# Patient Record
Sex: FEMALE | Race: WHITE | Employment: OTHER | URBAN - METROPOLITAN AREA
[De-identification: names, ages, dates, MRNs, and addresses within clinical notes are randomized per-mention and may not be internally consistent; named-entity substitution may affect disease eponyms.]

---

## 2018-05-09 ENCOUNTER — ALLIED HEALTH/NURSE VISIT (OUTPATIENT)
Dept: FAMILY MEDICINE | Facility: CLINIC | Age: 33
End: 2018-05-09
Payer: COMMERCIAL

## 2018-05-09 VITALS — WEIGHT: 194 LBS

## 2018-05-09 DIAGNOSIS — I10 ESSENTIAL HYPERTENSION: ICD-10-CM

## 2018-05-09 DIAGNOSIS — N91.2 AMENORRHEA: Primary | ICD-10-CM

## 2018-05-09 LAB — HCG UR QL: POSITIVE

## 2018-05-09 PROCEDURE — 81025 URINE PREGNANCY TEST: CPT | Performed by: FAMILY MEDICINE

## 2018-05-09 PROCEDURE — 99207 ZZC NO CHARGE NURSE ONLY: CPT

## 2018-05-09 RX ORDER — PRENATAL VIT/IRON FUM/FOLIC AC 27MG-0.8MG
1 TABLET ORAL DAILY
Qty: 100 TABLET | Refills: 3 | COMMUNITY
Start: 2018-05-09 | End: 2021-12-17

## 2018-05-09 RX ORDER — METHYLDOPA 250 MG/1
250 TABLET, FILM COATED ORAL 3 TIMES DAILY
COMMUNITY
Start: 2018-05-09 | End: 2018-05-09 | Stop reason: ALTCHOICE

## 2018-05-09 RX ORDER — METHYLDOPA 250 MG/1
250 TABLET, FILM COATED ORAL 2 TIMES DAILY
Qty: 180 TABLET | Refills: 0 | Status: SHIPPED | OUTPATIENT
Start: 2018-05-09 | End: 2018-08-29

## 2018-05-09 NOTE — MR AVS SNAPSHOT
"              After Visit Summary   5/9/2018    Eleni Vallecillo    MRN: [id number]           Patient Information     Date Of Birth          1985        Visit Information        Provider Department      5/9/2018 10:30 AM PH NURSE [facility]        Today's Diagnoses     Amenorrhea    -  1    Essential hypertension           Follow-ups after your visit        Your next 10 appointments already scheduled     May 29, 2018  1:30 PM CDT   ESTABLISHED PRENATAL with Kandice Raza MD   [facility] ([facility])    [address] [place] [id number] [phone]              Who to contact     If you have questions or need follow up information about today's clinic visit or your schedule please contact [facility] directly at [phone].  Normal or non-critical lab and imaging results will be communicated to you by MyChart, letter or phone within 4 business days after the clinic has received the results. If you do not hear from us within 7 days, please contact the clinic through MyChart or phone. If you have a critical or abnormal lab result, we will notify you by phone as soon as possible.  Submit refill requests through [email or web address] or call your pharmacy and they will forward the refill request to us. Please allow 3 business days for your refill to be completed.          Additional Information About Your Visit        MyChart Information     [email or web address] lets you send messages to your doctor, view your test results, renew your prescriptions, schedule appointments and more. To sign up, go to [email or web address] . Click on \"Log in\" on the left side of the screen, which will take you to the Welcome page. Then click on \"Sign up Now\" on the right side of the page.     You will be asked to enter the access code listed below, as well as some personal information. Please follow the directions to create your username and password.     Your access " code is: [email or web address]  Expires: 2018 11:42 AM     Your access code will  in 90 days. If you need help or a new code, please call your [place] clinic or [phone].        Care EveryWhere ID     This is your Care EveryWhere ID. This could be used by other organizations to access your [place] medical records  [id number]         Blood Pressure from Last 3 Encounters:   No data found for BP    Weight from Last 3 Encounters:   18 194 lb (88 kg)              We Performed the Following     HCG qualitative urine          Today's Medication Changes          These changes are accurate as of 18 11:42 AM.  If you have any questions, ask your nurse or doctor.               These medicines have changed or have updated prescriptions.        Dose/Directions    methyldopa 250 MG tablet   Commonly known as:  ALDOMET   This may have changed:    - medication strength  - when to take this   Used for:  Essential hypertension        Dose:  250 mg   Take 1 tablet (250 mg) by mouth 2 times daily   Quantity:  180 tablet   Refills:  0            Where to get your medicines      These medications were sent to [facility] [address] [place] [address] AT [facility] [address] [facility] [address]  [address] [facility] [address]     Phone:  [phone]     methyldopa 250 MG tablet                Primary Care Provider Fax #    Physician No Ref-Primary [phone]       No address on file        Equal Access to Services     PATT HUTCHINS AH: Gopal bran Soabram, waaxda luqadaha, qaybta kaalmada valerie, nicolas stanley. So [facility] [phone].    ATENCIÓN: Si habla español, tiene a felton disposición servicios gratuitos de asistencia lingüística. Aide de la cruz [phone].    We comply with applicable federal civil rights laws and [place] laws. We do not discriminate on the basis of race, color, national origin, age, disability, sex, sexual orientation, or gender  identity.            Thank you!     Thank you for choosing [facility]  for your care. Our goal is always to provide you with excellent care. Hearing back from our patients is one way we can continue to improve our services. Please take a few minutes to complete the written survey that you may receive in the mail after your visit with us. Thank you!             Your Updated Medication List - Protect others around you: Learn how to safely use, store and throw away your medicines at www.disposemymeds.org.          This list is accurate as of 5/9/18 11:42 AM.  Always use your most recent med list.                   Brand Name Dispense Instructions for use Diagnosis    methyldopa 250 MG tablet    ALDOMET    180 tablet    Take 1 tablet (250 mg) by mouth 2 times daily    Essential hypertension       prenatal multivitamin plus iron 27-0.8 MG Tabs per tablet     100 tablet    Take 1 tablet by mouth daily

## 2018-05-09 NOTE — PROGRESS NOTES
Eleni Vallecillo is a 32 year old here today for a pregnancy test.  LMP: No LMP recorded.  Wt: 194 lbs 0 oz.    Symptoms include weight gain, breast tenderness, absence of menses, fatigue and morning sickness.    Eleni informed of positive pregnancy test results. JORGE: 18    Educational advice given: nutrition.    Current medications reviewed: Yes    Previous pregnancy history remarkable for: hypertension.    Plan: schedule appointment with OB Educator and/or OB class, follow-up appointment with Dr. Rzaa for pre-bari care, take multivitamin or pre- vitamins and OB Education packet given.    She is to call back if she has any questions or concerns.  She is advised to notify a provider immediately if she experiences any severe cramping or abdominal pain or any vaginal bleeding.    NOTES: Patient is a Jehova's witness so no blood products. Reports that she worked with a midwife for her last pregnancy and she labored in the water but ended up delivering on the table.  She was put on methyldopa for high BP which she has continue to take since that birth a few years ago.  BP today was 132/80 so Dr. Raza would like her to increase to 250 mg BID. Pt reports that she had a mild hemorrhage and some tearing with last delivery.  She also notes that she had a miscarriage in 2017 at 8 weeks.  Denies any bleeding or cramping with this pregnancy.      Priscila Tony RN. . .  2018, 11:16 AM

## 2018-05-14 ENCOUNTER — PRENATAL OFFICE VISIT (OUTPATIENT)
Dept: FAMILY MEDICINE | Facility: CLINIC | Age: 33
End: 2018-05-14
Payer: COMMERCIAL

## 2018-05-14 VITALS — HEIGHT: 72 IN

## 2018-05-14 DIAGNOSIS — Z32.01 PREGNANCY, CONFIRMED, NOT FIRST: Primary | ICD-10-CM

## 2018-05-14 PROCEDURE — 99207 ZZC NO CHARGE NURSE ONLY: CPT

## 2018-05-14 NOTE — PROGRESS NOTES
1. Have you had chicken pox?   No    Genetic Screening    Has the patient, baby's father, or anyone in either family had:     1. Thalassemia and and MCV result less than 80?No   2.  Neural tube defect? No   3.  Congenital heart defect?No   4. Down's syndrome?No   5.  Joseph-Sachs disease?No   6. Sickle Cell disease or trait?No   7. Hemophilia or other inherited problems of blood?No   8. Muscular dystropy?No   9.  Cystic fibrosis?No  10. Chucho's Chorea?No  11. Mental Retardation or autism?  No         If yes, was the person tested for Fragile X?   12. Any other inherited genetic or chromosomal disorders?No  13. Metabolic disorders such as diabetes or PKU?No  14. A child born with defects not listed above?No  15. Recurrent pregnancy loss or stillbirth?No  16.  Has the patient had any medications/street drugs/alcohol since her last menstrual period?No  18.  Does the patient or baby's father have any other genetic risks. No  Jared's MGM is a twin.

## 2018-05-14 NOTE — MR AVS SNAPSHOT
After Visit Summary   5/14/2018    Eleni Vallecillo    MRN: [id number]           Patient Information     Date Of Birth          1985        Visit Information        Provider Department      5/14/2018 2:00 PM NL OB INTAKE [facility]        Today's Diagnoses     Pregnancy, confirmed, not first    -  1       Follow-ups after your visit        Your next 10 appointments already scheduled     May 24, 2018  4:15 PM CDT   LAB with NL LAB PMC   [facility] ([facility])    [address] [place] [id number] [phone]           Please do not eat 10-12 hours before your appointment if you are coming in fasting for labs on lipids, cholesterol, or glucose (sugar). This does not apply to pregnant women. Water, hot tea and black coffee (with nothing added) are okay. Do not drink other fluids, diet soda or chew gum.            May 24, 2018  4:45 PM CDT   US OB < 14 WEEKS SINGLE with PHUS1   [facility] ([facility])    [address] [place] [id number] [phone]           Please bring a list of your medicines (including vitamins, minerals and over-the-counter drugs). Also, tell your doctor about any allergies you may have. Wear comfortable clothes and leave your valuables at home.  If you re less than 20 weeks drink four 8-ounce glasses of fluid an hour before your exam. If you need to empty your bladder before your exam, try to release only a little urine. Then, drink another glass of fluid.  You may have up to two family members in the exam room. If you bring a small child, an adult must be there to care for him or her.  Please call the Imaging Department at your exam site with any questions.            May 29, 2018  1:30 PM CDT   ESTABLISHED PRENATAL with Kandice Raza MD   [facility] ([facility])    [address] [place] [phone]  "  [phone]              Future tests that were ordered for you today     Open Standing Orders        Priority Remaining Interval Expires Ordered    Treponema Abs w Reflex to RPR and Titer Routine 1/1 7/1/2018 5/14/2018    HIV Antigen Antibody Combo Routine 1/1 7/1/2018 5/14/2018    HEPATITIS B SURFACE ANTIGEN Routine 1/1 7/1/2018 5/14/2018    CBC WITH PLATELETS Routine 1/1 7/1/2018 5/14/2018    Rubella Antibody IgG Quantitative Routine 1/1 7/1/2018 5/14/2018    ABO/Rh type and screen Routine 1/1 7/1/2018 5/14/2018    *UA reflex to Microscopic and Culture ([place]; [facility]; [facility]; [facility]; [facility]; [facility]; [place]; [place]) Routine 1/1 7/1/2018 5/14/2018          Open Future Orders        Priority Expected Expires Ordered    US OB < 14 Weeks Single Routine 5/14/2018 5/14/2019 5/14/2018            Who to contact     If you have questions or need follow up information about today's clinic visit or your schedule please contact [facility] directly at [phone].  Normal or non-critical lab and imaging results will be communicated to you by MyChart, letter or phone within 4 business days after the clinic has received the results. If you do not hear from us within 7 days, please contact the clinic through MyChart or phone. If you have a critical or abnormal lab result, we will notify you by phone as soon as possible.  Submit refill requests through [employer] or call your pharmacy and they will forward the refill request to us. Please allow 3 business days for your refill to be completed.          Additional Information About Your Visit        GamemasterharInhibOx Information     [employer] lets you send messages to your doctor, view your test results, renew your prescriptions, schedule appointments and more. To sign up, go to [email or web address] . Click on \"Log in\" on the left side of the screen, which will take you to the Welcome page. Then click on \"Sign up Now\" on " the right side of the page.     You will be asked to enter the access code listed below, as well as some personal information. Please follow the directions to create your username and password.     Your access code is: [email or web address]  Expires: 2018 11:42 AM     Your access code will  in 90 days. If you need help or a new code, please call your [place] clinic or [phone].        Care EveryWhere ID     This is your Care EveryWhere ID. This could be used by other organizations to access your [place] medical records  [id number]        Your Vitals Were     Height Last Period                6' (1.829 m) 2018           Blood Pressure from Last 3 Encounters:   No data found for BP    Weight from Last 3 Encounters:   18 194 lb (88 kg)               Primary Care Provider Fax #    Physician No Ref-Primary [phone]       No address on file        Equal Access to Services     [facility] : Hadii nereyda bergero Somojganali, waaxda luqadaha, qaybta kaalmada adeegyada, nicolas londono . So [facility] [phone].    ATENCIÓN: Si habla español, tiene a felton disposición servicios gratuitos de asistencia lingüística. Llame al [phone].    We comply with applicable federal civil rights laws and [place] laws. We do not discriminate on the basis of race, color, national origin, age, disability, sex, sexual orientation, or gender identity.            Thank you!     Thank you for choosing [facility]  for your care. Our goal is always to provide you with excellent care. Hearing back from our patients is one way we can continue to improve our services. Please take a few minutes to complete the written survey that you may receive in the mail after your visit with us. Thank you!             Your Updated Medication List - Protect others around you: Learn how to safely use, store and throw away your medicines at www.disposemymeds.org.          This list is accurate as of  5/14/18  2:30 PM.  Always use your most recent med list.                   Brand Name Dispense Instructions for use Diagnosis    methyldopa 250 MG tablet    ALDOMET    180 tablet    Take 1 tablet (250 mg) by mouth 2 times daily    Essential hypertension       prenatal multivitamin plus iron 27-0.8 MG Tabs per tablet     100 tablet    Take 1 tablet by mouth daily

## 2018-05-24 ENCOUNTER — HOSPITAL ENCOUNTER (OUTPATIENT)
Dept: ULTRASOUND IMAGING | Facility: CLINIC | Age: 33
Discharge: HOME | End: 2018-05-24
Attending: FAMILY MEDICINE | Admitting: FAMILY MEDICINE
Payer: COMMERCIAL

## 2018-05-24 DIAGNOSIS — Z32.01 PREGNANCY, CONFIRMED, NOT FIRST: ICD-10-CM

## 2018-05-24 LAB
ABO + RH BLD: NORMAL
ABO + RH BLD: NORMAL
ALBUMIN UR-MCNC: NEGATIVE MG/DL
APPEARANCE UR: ABNORMAL
BACTERIA #/AREA URNS HPF: ABNORMAL /HPF
BILIRUB UR QL STRIP: NEGATIVE
BLD GP AB SCN SERPL QL: NORMAL
BLOOD BANK CMNT PATIENT-IMP: NORMAL
COLOR UR AUTO: YELLOW
ERYTHROCYTE [DISTWIDTH] IN BLOOD BY AUTOMATED COUNT: 13.6 % (ref 10–15)
GLUCOSE UR STRIP-MCNC: NEGATIVE MG/DL
HCT VFR BLD AUTO: 39.5 % (ref 35–47)
HGB BLD-MCNC: 13.5 G/DL (ref 11.7–15.7)
HGB UR QL STRIP: NEGATIVE
KETONES UR STRIP-MCNC: NEGATIVE MG/DL
LEUKOCYTE ESTERASE UR QL STRIP: NEGATIVE
MCH RBC QN AUTO: 29.8 PG (ref 26.5–33)
MCHC RBC AUTO-ENTMCNC: 34.2 G/DL (ref 31.5–36.5)
MCV RBC AUTO: 87 FL (ref 78–100)
MUCOUS THREADS #/AREA URNS LPF: PRESENT /LPF
NITRATE UR QL: NEGATIVE
PH UR STRIP: 6 PH (ref 5–7)
PLATELET # BLD AUTO: 251 10E9/L (ref 150–450)
RBC # BLD AUTO: 4.53 10E12/L (ref 3.8–5.2)
RBC #/AREA URNS AUTO: 1 /HPF (ref 0–2)
SOURCE: ABNORMAL
SP GR UR STRIP: 1.02 (ref 1–1.03)
SPECIMEN EXP DATE BLD: NORMAL
SQUAMOUS #/AREA URNS AUTO: 2 /HPF (ref 0–1)
UROBILINOGEN UR STRIP-MCNC: 0 MG/DL (ref 0–2)
WBC # BLD AUTO: 7.2 10E9/L (ref 4–11)
WBC #/AREA URNS AUTO: <1 /HPF (ref 0–5)

## 2018-05-24 PROCEDURE — 86762 RUBELLA ANTIBODY: CPT | Performed by: FAMILY MEDICINE

## 2018-05-24 PROCEDURE — 76801 OB US < 14 WKS SINGLE FETUS: CPT

## 2018-05-24 PROCEDURE — 81001 URINALYSIS AUTO W/SCOPE: CPT | Performed by: FAMILY MEDICINE

## 2018-05-24 PROCEDURE — 86901 BLOOD TYPING SEROLOGIC RH(D): CPT | Performed by: FAMILY MEDICINE

## 2018-05-24 PROCEDURE — 87340 HEPATITIS B SURFACE AG IA: CPT | Performed by: FAMILY MEDICINE

## 2018-05-24 PROCEDURE — 86780 TREPONEMA PALLIDUM: CPT | Performed by: FAMILY MEDICINE

## 2018-05-24 PROCEDURE — 36415 COLL VENOUS BLD VENIPUNCTURE: CPT | Performed by: FAMILY MEDICINE

## 2018-05-24 PROCEDURE — 85027 COMPLETE CBC AUTOMATED: CPT | Performed by: FAMILY MEDICINE

## 2018-05-24 PROCEDURE — 87389 HIV-1 AG W/HIV-1&-2 AB AG IA: CPT | Performed by: FAMILY MEDICINE

## 2018-05-24 PROCEDURE — 86850 RBC ANTIBODY SCREEN: CPT | Performed by: FAMILY MEDICINE

## 2018-05-24 PROCEDURE — 86900 BLOOD TYPING SEROLOGIC ABO: CPT | Performed by: FAMILY MEDICINE

## 2018-05-25 LAB
HBV SURFACE AG SERPL QL IA: NONREACTIVE
HIV 1+2 AB+HIV1 P24 AG SERPL QL IA: NONREACTIVE
RUBV IGG SERPL IA-ACNC: 36 IU/ML
T PALLIDUM AB SER QL: NONREACTIVE

## 2018-05-25 NOTE — PROGRESS NOTES
Notify her that her ultrasound is all normal. Will review in more detail at her next appointment.   Kandice Raza MD

## 2018-05-25 NOTE — PROGRESS NOTES
Notify her that her labs are normal, will review in more detail at her first OB visit.   Kandice Raza MD

## 2018-05-29 ENCOUNTER — TELEPHONE (OUTPATIENT)
Dept: FAMILY MEDICINE | Facility: CLINIC | Age: 33
End: 2018-05-29

## 2018-05-29 ENCOUNTER — PRENATAL OFFICE VISIT (OUTPATIENT)
Dept: FAMILY MEDICINE | Facility: CLINIC | Age: 33
End: 2018-05-29
Payer: COMMERCIAL

## 2018-05-29 VITALS
SYSTOLIC BLOOD PRESSURE: 102 MMHG | WEIGHT: 195.6 LBS | TEMPERATURE: 98.1 F | BODY MASS INDEX: 26.53 KG/M2 | DIASTOLIC BLOOD PRESSURE: 62 MMHG | HEART RATE: 80 BPM | OXYGEN SATURATION: 98 %

## 2018-05-29 DIAGNOSIS — Z12.4 SCREENING FOR MALIGNANT NEOPLASM OF CERVIX: ICD-10-CM

## 2018-05-29 DIAGNOSIS — I10 BENIGN ESSENTIAL HYPERTENSION: ICD-10-CM

## 2018-05-29 DIAGNOSIS — O09.91 SUPERVISION OF HIGH RISK PREGNANCY IN FIRST TRIMESTER: Primary | ICD-10-CM

## 2018-05-29 LAB
SPECIMEN SOURCE: NORMAL
WET PREP SPEC: NORMAL

## 2018-05-29 PROCEDURE — 87491 CHLMYD TRACH DNA AMP PROBE: CPT | Performed by: FAMILY MEDICINE

## 2018-05-29 PROCEDURE — 87591 N.GONORRHOEAE DNA AMP PROB: CPT | Performed by: FAMILY MEDICINE

## 2018-05-29 PROCEDURE — G0145 SCR C/V CYTO,THINLAYER,RESCR: HCPCS | Performed by: FAMILY MEDICINE

## 2018-05-29 PROCEDURE — 87624 HPV HI-RISK TYP POOLED RSLT: CPT | Performed by: FAMILY MEDICINE

## 2018-05-29 PROCEDURE — 99207 ZZC FIRST OB VISIT: CPT | Performed by: FAMILY MEDICINE

## 2018-05-29 PROCEDURE — 87210 SMEAR WET MOUNT SALINE/INK: CPT | Performed by: FAMILY MEDICINE

## 2018-05-29 ASSESSMENT — PAIN SCALES - GENERAL: PAINLEVEL: NO PAIN (0)

## 2018-05-29 NOTE — LETTER
[address] [address]  [phone]        June 6, 2018    Eleni Vallecillo  [address] [facility] [address]          Dear Eleni,     Your  STD tests are normal.  We tried to call you.  Your phone mailbox is full.      Sincerely,        Kandice Raza M.D.

## 2018-05-29 NOTE — LETTER
June 7, 2018    Eleni Vallecillo  [address] [facility] [address]    Dear Eleni,  We are happy to inform you that your PAP smear result from 5/29/18 is normal.  We are now able to do a follow up test on PAP smears. The DNA test is for HPV (Human Papilloma Virus). Cervical cancer is closely linked with certain types of HPV. Your results showed no evidence of high risk HPV.  Therefore we recommend you return in 3 years for your next pap smear.  You will still need to return to the clinic every year for an annual exam and other preventive tests.  Please contact the clinic at [phone] with any questions.  Sincerely,    Kandice Raza MD/brent

## 2018-05-29 NOTE — MR AVS SNAPSHOT
After Visit Summary   5/29/2018    Eleni Vallecillo    MRN: [id number]           Patient Information     Date Of Birth          1985        Visit Information        Provider Department      5/29/2018 1:30 PM Kandice Raza MD [facility]        Today's Diagnoses     Supervision of high risk pregnancy in first trimester    -  1    Benign essential hypertension        Screening for malignant neoplasm of cervix           Follow-ups after your visit        Your next 10 appointments already scheduled     Jul 03, 2018  2:15 PM CDT   ESTABLISHED PRENATAL with Kandice Raza MD   [facility] ([facility])    [address] [place] [phone]   [phone]            Jul 27, 2018  2:30 PM CDT   US OB > 14 WEEKS COMPLETE SINGLE with PHUS1   [facility] ([facility])    [address] [place] [phone]   [phone]           Please bring a list of your medicines (including vitamins, minerals and over-the-counter drugs). Also, tell your doctor about any allergies you may have. Wear comfortable clothes and leave your valuables at home.  If you re less than 20 weeks drink four 8-ounce glasses of fluid an hour before your exam. If you need to empty your bladder before your exam, try to release only a little urine. Then, drink another glass of fluid.  You may have up to two family members in the exam room. If you bring a small child, an adult must be there to care for him or her.  Please call the Imaging Department at your exam site with any questions.              Future tests that were ordered for you today     Open Future Orders        Priority Expected Expires Ordered    US OB > 14 Weeks Complete Single Routine  5/29/2019 5/29/2018            Who to contact     If you have questions or need follow up information about today's clinic visit or your schedule please contact [place]  [facility] directly at [phone].  Normal or non-critical lab and imaging results will be communicated to you by MyChart, letter or phone within 4 business days after the clinic has received the results. If you do not hear from us within 7 days, please contact the clinic through Qloudhart or phone. If you have a critical or abnormal lab result, we will notify you by phone as soon as possible.  Submit refill requests through [employer] or call your pharmacy and they will forward the refill request to us. Please allow 3 business days for your refill to be completed.          Additional Information About Your Visit        QloudharKoduco Information     [employer] lets you send messages to your doctor, view your test results, renew your prescriptions, schedule appointments and more. To sign up, go to [email or web address] . Click on \"Log in\" on the left side of the screen, which will take you to the Welcome page. Then click on \"Sign up Now\" on the right side of the page.     You will be asked to enter the access code listed below, as well as some personal information. Please follow the directions to create your username and password.     Your access code is: [email or web address]  Expires: 2018 11:42 AM     Your access code will  in 90 days. If you need help or a new code, please call your [place] clinic or [phone].        Care EveryWhere ID     This is your Care EveryWhere ID. This could be used by other organizations to access your [place] medical records  [id number]        Your Vitals Were     Pulse Temperature Last Period Pulse Oximetry BMI (Body Mass Index)       80 98.1  F (36.7  C) (Temporal) 2018 (Exact Date) 98% 26.53 kg/m2        Blood Pressure from Last 3 Encounters:   18 102/62    Weight from Last 3 Encounters:   18 195 lb 9.6 oz (88.7 kg)   18 194 lb (88 kg)              We Performed the Following     CHLAMYDIA TRACHOMATIS PCR     HPV High Risk Types DNA Cervical     NEISSERIA " GONORRHOEA PCR     Pap imaged thin layer screen with HPV - recommended age 30 - 65 years (select HPV order below)     Wet prep        Primary Care Provider Fax #    Physician No Ref-Primary [phone]       No address on file        Equal Access to Services     PATT HUTCHINS : Hadii aad ku hadjuan Sanders, waedda luqadaha, qaybta kaalmada valerie, nicolas callahan lindajessika mar spring stanley. So [facility] [place] [facility] [phone].    ATENCIÓN: Si habla español, tiene a felton disposición servicios gratuitos de asistencia lingüística. Llame al [phone].    We comply with applicable federal civil rights laws and [place] laws. We do not discriminate on the basis of race, color, national origin, age, disability, sex, sexual orientation, or gender identity.            Thank you!     Thank you for choosing [facility]  for your care. Our goal is always to provide you with excellent care. Hearing back from our patients is one way we can continue to improve our services. Please take a few minutes to complete the written survey that you may receive in the mail after your visit with us. Thank you!             Your Updated Medication List - Protect others around you: Learn how to safely use, store and throw away your medicines at www.disposemymeds.org.          This list is accurate as of 5/29/18  3:23 PM.  Always use your most recent med list.                   Brand Name Dispense Instructions for use Diagnosis    methyldopa 250 MG tablet    ALDOMET    180 tablet    Take 1 tablet (250 mg) by mouth 2 times daily    Essential hypertension       prenatal multivitamin plus iron 27-0.8 MG Tabs per tablet     100 tablet    Take 1 tablet by mouth daily

## 2018-05-29 NOTE — PROGRESS NOTES
Eleni Vallecillo is a 32 year old,  female who presents with her friend and daughter for 1st OB visit.   Jared could not be here today. She has talked with OB Ed prior to today's visit.  She is 10w6d by exact LMP which was off by 4 days from her 9 week ultrasound.  She has no concerns.  She has had more nausea this pregnancy already and some dry heaves.  She does have high blood pressure and is on Aldomet, taking 250 mg twice daily.  She is averaging blood pressure readings in the 120's/80's. Patient does mention that she does not wish to have blood transfusions for any reason due to Hinduism beliefs (Jehova's Witness), even if it is a life or death situation. She is open to other treatments such as IV fluids and would consider other blood components such as platelets or plasma depending on the situation.      Past Medical History:   Diagnosis Date     Hypertension      I reviewed her previous OB history:  Obstetric History       T1      L1     SAB1   TAB0   Ectopic0   Multiple0   Live Births1       # Outcome Date GA Lbr Lc/2nd Weight Sex Delivery Anes PTL Lv   3 Current            2 SAB 2017 8w0d          1 Term 14 40w0d  7 lb 6 oz (3.345 kg) F  None N DIANA      Name: Roverto      Obstetric Comments   EDC  2018 based on LMP.   to Jared.  This will be their first delivery at [facility].   .    Past Medical History:   Diagnosis Date     Hypertension       Patient Active Problem List    Diagnosis Date Noted     Supervision of high risk pregnancy in first trimester 2018     Priority: Medium     Benign essential hypertension 2018     Priority: Medium      O:  Vitals noted.  Patient alert, oriented, and in no acute distress.    Eyes: PERRLA, EOMI.  Ears:  Canals clear, TM's nl bilaterally.  No erythema or fluid.   Nares patent without inflammation or drainage.   Oral:  Oropharynx nl without erythema, exudate, mass or other lesions.   Neck:  Supple without  lymphadenopathy, JVD or masses.  CV:  RRR without murmur.  Respiratory:  Lungs clear to auscultation bilaterally.  Breasts:  not examined today.    Abdomen:  Soft, nontender, nondistended with good bowel sounds and no masses or hepatosplenomegaly.  Uterus is not palpable in the abdomen.   Fetal heart tones were heard as noted in OB flowsheet.    Vaginal exam reveals normal external and internal genitalia.  She was very guarded during exam, kept her hands down over the pubic area during exam and had a difficult time relaxing pelvis to allow speculum. she was cooperative and pleasant, did attempt to relax.  This is consistent with notes from her previous exams going to back to 2007 and even referring to prior to that.  Cervix is anterior, closed, long and thick.  No lesions or abnormalities seen.  Bimanual exam reveals a nontender, gravid uterus consistant with 8-10 weeks with no CMT.  No adnexal masses or tenderness.   Extremities are normal without edema or lesions.    Routine labs were sent, including wet prep, gen probe and pap.    A:  First OB visit High risk due to advanced maternal age and blood pressure   P:  Discussed routine pregnancy care, schedule of visits, nutrition, exercise, travel, answered questions.  Will follow up in 4 weeks or sooner with any concerns.  Will call with lab results.  We did discuss level II u/s, she is unsure if she wants that. Discussed afp, will consult with  and consider for next time, also not sure she wants that.  Discussed keeping her on current dose of aldomet and not dropping at this time since BP readings at goal but not low.  Discussed course of HTN in pregnancy and risk for needing higher doses later.   Will recommend 81 mg ASA beginning at 12 weeks to reduce pre-eclampsia risk.     Kandice Raza MD

## 2018-05-29 NOTE — TELEPHONE ENCOUNTER
** Patient Call Attempt With Normal Lab or Test Results**    I have attempted to contact this patient by phone with the following results: no answer, Unable to leave message as mailbox is full. .    When patient returns call, please advise of the following result.  If patient has further questions or concerns route call back to team, thank you.    Notify her that her ultrasound is all normal. Will review in more detail at her next appointment.     Notify her that her labs are normal, will review in more detail at her first OB visit.     MD Jennifer Young CMA ([facility])

## 2018-05-30 ENCOUNTER — TELEPHONE (OUTPATIENT)
Dept: FAMILY MEDICINE | Facility: CLINIC | Age: 33
End: 2018-05-30

## 2018-05-30 LAB
C TRACH DNA SPEC QL NAA+PROBE: NEGATIVE
N GONORRHOEA DNA SPEC QL NAA+PROBE: NEGATIVE
SPECIMEN SOURCE: NORMAL
SPECIMEN SOURCE: NORMAL

## 2018-05-30 NOTE — TELEPHONE ENCOUNTER
----- Message from Kandice Raza MD sent at 5/29/2018  7:33 PM CDT -----  Notify her, no vaginal yeast or bacterial infection.   Kandice Raza MD

## 2018-05-30 NOTE — TELEPHONE ENCOUNTER
Attempted to call pt, no answer, unable to leave msg. Other lab results pending. Ca Reinoso, CMA

## 2018-05-31 LAB
COPATH REPORT: NORMAL
PAP: NORMAL

## 2018-06-04 LAB
FINAL DIAGNOSIS: NORMAL
HPV HR 12 DNA CVX QL NAA+PROBE: NEGATIVE
HPV16 DNA SPEC QL NAA+PROBE: NEGATIVE
HPV18 DNA SPEC QL NAA+PROBE: NEGATIVE
SPECIMEN DESCRIPTION: NORMAL
SPECIMEN SOURCE CVX/VAG CYTO: NORMAL

## 2018-07-27 ENCOUNTER — HOSPITAL ENCOUNTER (OUTPATIENT)
Dept: ULTRASOUND IMAGING | Facility: CLINIC | Age: 33
Discharge: HOME | End: 2018-07-27
Attending: FAMILY MEDICINE | Admitting: FAMILY MEDICINE
Payer: COMMERCIAL

## 2018-07-27 DIAGNOSIS — O09.91 SUPERVISION OF HIGH RISK PREGNANCY IN FIRST TRIMESTER: ICD-10-CM

## 2018-07-27 PROCEDURE — 76805 OB US >/= 14 WKS SNGL FETUS: CPT

## 2018-08-29 ENCOUNTER — PRENATAL OFFICE VISIT (OUTPATIENT)
Dept: FAMILY MEDICINE | Facility: CLINIC | Age: 33
End: 2018-08-29
Payer: COMMERCIAL

## 2018-08-29 VITALS
HEART RATE: 94 BPM | WEIGHT: 205.4 LBS | DIASTOLIC BLOOD PRESSURE: 80 MMHG | SYSTOLIC BLOOD PRESSURE: 122 MMHG | BODY MASS INDEX: 27.86 KG/M2 | OXYGEN SATURATION: 97 % | TEMPERATURE: 97.6 F

## 2018-08-29 DIAGNOSIS — O09.91 SUPERVISION OF HIGH RISK PREGNANCY IN FIRST TRIMESTER: Primary | ICD-10-CM

## 2018-08-29 DIAGNOSIS — I10 BENIGN ESSENTIAL HYPERTENSION: ICD-10-CM

## 2018-08-29 PROCEDURE — 99207 ZZC COMPLICATED OB VISIT: CPT | Performed by: FAMILY MEDICINE

## 2018-08-29 RX ORDER — METHYLDOPA 250 MG/1
250 TABLET, FILM COATED ORAL DAILY
COMMUNITY
Start: 2018-08-29 | End: 2018-11-09 | Stop reason: ALTCHOICE

## 2018-08-29 ASSESSMENT — PAIN SCALES - GENERAL: PAINLEVEL: NO PAIN (0)

## 2018-08-29 NOTE — MR AVS SNAPSHOT
After Visit Summary   8/29/2018    Eleni Vallecillo    MRN: [id number]           Patient Information     Date Of Birth          1985        Visit Information        Provider Department      8/29/2018 2:45 PM Kandice Raza MD [facility]        Today's Diagnoses     Supervision of high risk pregnancy in first trimester    -  1    Benign essential hypertension        Essential hypertension           Follow-ups after your visit        Your next 10 appointments already scheduled     Sep 28, 2018 11:45 AM CDT   ESTABLISHED PRENATAL with Kandice Raza MD   [facility]    [address] [place] [address] [id number] [phone]            Oct 24, 2018 10:00 AM CDT   ESTABLISHED PRENATAL with Kandice Raza MD   [facility] ([facility])    [address] [place] [address] [id number] [phone]              Future tests that were ordered for you today     Open Future Orders        Priority Expected Expires Ordered    Basic metabolic panel  (Ca, Cl, CO2, Creat, Gluc, K, Na, BUN) Routine 9/26/2018 11/29/2018 8/29/2018    Protein Random Urine with Creat Ratio GH Routine 9/26/2018 11/29/2018 8/29/2018    CBC with platelets Routine 9/26/2018 11/29/2018 8/29/2018    Uric acid Routine 9/26/2018 11/29/2018 8/29/2018    ALT Routine 9/26/2018 11/29/2018 8/29/2018    AST Routine 9/26/2018 11/29/2018 8/29/2018    Glucose tolerance, gest screen, 1 hour Routine 9/26/2018 11/29/2018 8/29/2018    Treponema Abs w Reflex to RPR and Titer Routine 9/26/2018 11/29/2018 8/29/2018            Who to contact     If you have questions or need follow up information about today's clinic visit or your schedule please contact [facility] directly at [phone].  Normal or non-critical lab and imaging results will be communicated to you by MyChart, letter or phone within 4  business days after the clinic has received the results. If you do not hear from us within 7 days, please contact the clinic through iCAD or phone. If you have a critical or abnormal lab result, we will notify you by phone as soon as possible.  Submit refill requests through iCAD or call your pharmacy and they will forward the refill request to us. Please allow 3 business days for your refill to be completed.          Additional Information About Your Visit        Care EveryWhere ID     This is your Care EveryWhere ID. This could be used by other organizations to access your [place] medical records  [id number]        Your Vitals Were     Pulse Temperature Last Period Pulse Oximetry BMI (Body Mass Index)       94 97.6  F (36.4  C) (Temporal) 03/14/2018 (Exact Date) 97% 27.86 kg/m2        Blood Pressure from Last 3 Encounters:   08/29/18 122/80   05/29/18 102/62    Weight from Last 3 Encounters:   08/29/18 205 lb 6.4 oz (93.2 kg)   05/29/18 195 lb 9.6 oz (88.7 kg)   05/09/18 194 lb (88 kg)                 Today's Medication Changes          These changes are accurate as of 8/29/18  3:27 PM.  If you have any questions, ask your nurse or doctor.               These medicines have changed or have updated prescriptions.        Dose/Directions    methyldopa 250 MG tablet   Commonly known as:  ALDOMET   This may have changed:  when to take this   Used for:  Essential hypertension   Changed by:  Kandice Raza MD        Dose:  250 mg   Take 1 tablet (250 mg) by mouth daily   Refills:  0                Primary Care Provider Fax #    Physician No Ref-Primary [phone]       No address on file        Equal Access to Services     [facility]: Hadii nereyda Sanders, waaxda luqadaha, qaybta kaalnicolas johnson. So [facility] [phone].    ATENCIÓN: Si habla español, tiene a felton disposición servicios gratuitos de asistencia lingüística. Llame al  [phone].    We comply with applicable federal civil rights laws and [place] laws. We do not discriminate on the basis of race, color, national origin, age, disability, sex, sexual orientation, or gender identity.            Thank you!     Thank you for choosing [facility]  for your care. Our goal is always to provide you with excellent care. Hearing back from our patients is one way we can continue to improve our services. Please take a few minutes to complete the written survey that you may receive in the mail after your visit with us. Thank you!             Your Updated Medication List - Protect others around you: Learn how to safely use, store and throw away your medicines at www.disposemymeds.org.          This list is accurate as of 8/29/18  3:27 PM.  Always use your most recent med list.                   Brand Name Dispense Instructions for use Diagnosis    aspirin 81 MG tablet     90 tablet    Take 1 tablet (81 mg) by mouth daily    Benign essential hypertension       FOLIC ACID PO      Take 1 mg by mouth daily        methyldopa 250 MG tablet    ALDOMET     Take 1 tablet (250 mg) by mouth daily    Essential hypertension       prenatal multivitamin plus iron 27-0.8 MG Tabs per tablet     100 tablet    Take 1 tablet by mouth daily

## 2018-08-29 NOTE — PROGRESS NOTES
Concerns: None. Has some BH ctx, nothing worrisome.    Has not started her ASA.  Has not had follow up since her first OB visit. Asking about Down syndrome screen.  Discussed that she is too late for Quad screening, but she was mostly concerned about anything that would require attention at delivery. We discussed that her routine sono was normal, so major cardiac defects are unlikely.  She is ok with no other screening at this time.   She is only taking 1 Aldomet daily in the am, because her blood pressure tends to run low in the evening.  It was high on arrival today but lower after appt. Will continue on current dose for now, but discussed need to keep her BP at goal.    Having a boy, discussed circumcision and she is planning on it.   Discussed follow up in 4 weeks with cervix check, 1 hour GTT, RPR, and labs due to HTN, see orders.   Will also recommend Tdap and Flu shot at that time.   Encouraged her to start ASA, she has a Rx at pharmacy or can use OTC.    No headache, vision changes, lower extremity swelling, upper abdominal pain, chest pain, shortness of breath  Discussed PTL, PROM, and when to call or come in.  RTC 4 weeks.  Checklist updated, see prenatal flowsheet for details  Kandice Raza MD

## 2018-09-28 ENCOUNTER — PRENATAL OFFICE VISIT (OUTPATIENT)
Dept: FAMILY MEDICINE | Facility: CLINIC | Age: 33
End: 2018-09-28
Payer: COMMERCIAL

## 2018-09-28 VITALS
DIASTOLIC BLOOD PRESSURE: 80 MMHG | SYSTOLIC BLOOD PRESSURE: 138 MMHG | OXYGEN SATURATION: 97 % | TEMPERATURE: 97.9 F | WEIGHT: 214 LBS | BODY MASS INDEX: 29.02 KG/M2 | HEART RATE: 94 BPM

## 2018-09-28 DIAGNOSIS — I10 BENIGN ESSENTIAL HYPERTENSION: ICD-10-CM

## 2018-09-28 DIAGNOSIS — O09.93 SUPERVISION OF HIGH RISK PREGNANCY IN THIRD TRIMESTER: ICD-10-CM

## 2018-09-28 LAB
ALT SERPL W P-5'-P-CCNC: 16 U/L (ref 0–50)
ANION GAP SERPL CALCULATED.3IONS-SCNC: 7 MMOL/L (ref 3–14)
AST SERPL W P-5'-P-CCNC: 16 U/L (ref 0–45)
BUN SERPL-MCNC: 10 MG/DL (ref 7–30)
CALCIUM SERPL-MCNC: 8.7 MG/DL (ref 8.5–10.1)
CHLORIDE SERPL-SCNC: 104 MMOL/L (ref 94–109)
CO2 SERPL-SCNC: 27 MMOL/L (ref 20–32)
CREAT SERPL-MCNC: 0.77 MG/DL (ref 0.52–1.04)
CREAT UR-MCNC: 65 MG/DL
ERYTHROCYTE [DISTWIDTH] IN BLOOD BY AUTOMATED COUNT: 13.6 % (ref 10–15)
GFR SERPL CREATININE-BSD FRML MDRD: 86 ML/MIN/1.7M2
GLUCOSE 1H P 50 G GLC PO SERPL-MCNC: 116 MG/DL (ref 60–129)
GLUCOSE SERPL-MCNC: 118 MG/DL (ref 70–99)
HCT VFR BLD AUTO: 34.6 % (ref 35–47)
HGB BLD-MCNC: 11.7 G/DL (ref 11.7–15.7)
MCH RBC QN AUTO: 30.9 PG (ref 26.5–33)
MCHC RBC AUTO-ENTMCNC: 33.8 G/DL (ref 31.5–36.5)
MCV RBC AUTO: 91 FL (ref 78–100)
PLATELET # BLD AUTO: 192 10E9/L (ref 150–450)
POTASSIUM SERPL-SCNC: 3.4 MMOL/L (ref 3.4–5.3)
PROT UR-MCNC: 0.08 G/L
PROT/CREAT 24H UR: 0.12 G/G CR (ref 0–0.2)
RBC # BLD AUTO: 3.79 10E12/L (ref 3.8–5.2)
SODIUM SERPL-SCNC: 138 MMOL/L (ref 133–144)
URATE SERPL-MCNC: 3.9 MG/DL (ref 2.6–6)
WBC # BLD AUTO: 8 10E9/L (ref 4–11)

## 2018-09-28 PROCEDURE — 86780 TREPONEMA PALLIDUM: CPT | Performed by: FAMILY MEDICINE

## 2018-09-28 PROCEDURE — 84550 ASSAY OF BLOOD/URIC ACID: CPT | Performed by: FAMILY MEDICINE

## 2018-09-28 PROCEDURE — 80048 BASIC METABOLIC PNL TOTAL CA: CPT | Performed by: FAMILY MEDICINE

## 2018-09-28 PROCEDURE — 84156 ASSAY OF PROTEIN URINE: CPT | Performed by: FAMILY MEDICINE

## 2018-09-28 PROCEDURE — 82950 GLUCOSE TEST: CPT | Performed by: FAMILY MEDICINE

## 2018-09-28 PROCEDURE — 99207 ZZC COMPLICATED OB VISIT: CPT | Performed by: FAMILY MEDICINE

## 2018-09-28 PROCEDURE — 84460 ALANINE AMINO (ALT) (SGPT): CPT | Performed by: FAMILY MEDICINE

## 2018-09-28 PROCEDURE — 85027 COMPLETE CBC AUTOMATED: CPT | Performed by: FAMILY MEDICINE

## 2018-09-28 PROCEDURE — 84450 TRANSFERASE (AST) (SGOT): CPT | Performed by: FAMILY MEDICINE

## 2018-09-28 PROCEDURE — 36415 COLL VENOUS BLD VENIPUNCTURE: CPT | Performed by: FAMILY MEDICINE

## 2018-09-28 ASSESSMENT — PAIN SCALES - GENERAL: PAINLEVEL: NO PAIN (0)

## 2018-09-28 NOTE — MR AVS SNAPSHOT
After Visit Summary   9/28/2018    Eleni Vallecillo    MRN: [id number]           Patient Information     Date Of Birth          1985        Visit Information        Provider Department      9/28/2018 11:45 AM Kandice Raza MD [facility]        Today's Diagnoses     Supervision of high risk pregnancy in first trimester        Benign essential hypertension           Follow-ups after your visit        Your next 10 appointments already scheduled     Oct 24, 2018 10:00 AM CDT   ESTABLISHED PRENATAL with Kandice Raza MD   [facility]    [address] [place] [address] [id number] [phone]              Who to contact     If you have questions or need follow up information about today's clinic visit or your schedule please contact [facility] directly at [phone].  Normal or non-critical lab and imaging results will be communicated to you by MyChart, letter or phone within 4 business days after the clinic has received the results. If you do not hear from us within 7 days, please contact the clinic through MyChart or phone. If you have a critical or abnormal lab result, we will notify you by phone as soon as possible.  Submit refill requests through Untangle or call your pharmacy and they will forward the refill request to us. Please allow 3 business days for your refill to be completed.          Additional Information About Your Visit        Care EveryWhere ID     This is your Care EveryWhere ID. This could be used by other organizations to access your [place] medical records  [id number]        Your Vitals Were     Pulse Temperature Last Period Pulse Oximetry BMI (Body Mass Index)       94 97.9  F (36.6  C) (Temporal) 03/14/2018 (Exact Date) 97% 29.02 kg/m2        Blood Pressure from Last 3 Encounters:   09/28/18 138/80   08/29/18 122/80   05/29/18 102/62    Weight from Last 3 Encounters:    09/28/18 214 lb (97.1 kg)   08/29/18 205 lb 6.4 oz (93.2 kg)   05/29/18 195 lb 9.6 oz (88.7 kg)              We Performed the Following     ALT     AST     Basic metabolic panel  (Ca, Cl, CO2, Creat, Gluc, K, Na, BUN)     CBC with platelets     Creatinine urine calculation only     Glucose tolerance, gest screen, 1 hour     Protein  random urine with Creat Ratio     Treponema Abs w Reflex to RPR and Titer     Uric acid        Primary Care Provider Fax #    Physician No Ref-Primary [phone]       No address on file        Equal Access to Services     [facility]: Hadii nereyda bran Soabram, waaxda luqadaha, qaybta kaalmada valerie, nicolas londono . So [facility] [phone].    ATENCIÓN: Si habla español, tiene a felton disposición servicios gratuitos de asistencia lingüística. Ll[facility] [phone].    We comply with applicable federal civil rights laws and [place] laws. We do not discriminate on the basis of race, color, national origin, age, disability, sex, sexual orientation, or gender identity.            Thank you!     Thank you for choosing [facility]  for your care. Our goal is always to provide you with excellent care. Hearing back from our patients is one way we can continue to improve our services. Please take a few minutes to complete the written survey that you may receive in the mail after your visit with us. Thank you!             Your Updated Medication List - Protect others around you: Learn how to safely use, store and throw away your medicines at www.disposemymeds.org.          This list is accurate as of 9/28/18  1:25 PM.  Always use your most recent med list.                   Brand Name Dispense Instructions for use Diagnosis    aspirin 81 MG tablet     90 tablet    Take 1 tablet (81 mg) by mouth daily    Benign essential hypertension       FOLIC ACID PO      Take 1 mg by mouth daily        methyldopa 250 MG tablet    ALDOMET     Take 1 tablet  (250 mg) by mouth daily        prenatal multivitamin plus iron 27-0.8 MG Tabs per tablet     100 tablet    Take 1 tablet by mouth daily

## 2018-09-29 LAB — T PALLIDUM AB SER QL: NONREACTIVE

## 2018-10-01 ENCOUNTER — TELEPHONE (OUTPATIENT)
Dept: FAMILY MEDICINE | Facility: CLINIC | Age: 33
End: 2018-10-01

## 2018-10-01 NOTE — TELEPHONE ENCOUNTER
----- Message from Kandice Raza MD sent at 9/28/2018  2:32 PM CDT -----  Notify Eleni that all her labs are normal including her diabetes test.   Kandice Raza MD

## 2018-10-01 NOTE — TELEPHONE ENCOUNTER
Eleni returns call and message is relayed.  Eleni states understanding and had no other questions or concerns.

## 2018-10-11 PROBLEM — O09.93 SUPERVISION OF HIGH RISK PREGNANCY IN THIRD TRIMESTER: Status: ACTIVE | Noted: 2018-10-11

## 2018-10-11 PROBLEM — O09.91 SUPERVISION OF HIGH RISK PREGNANCY IN FIRST TRIMESTER: Status: RESOLVED | Noted: 2018-05-29 | Resolved: 2018-10-11

## 2018-11-09 ENCOUNTER — OFFICE VISIT (OUTPATIENT)
Dept: OBGYN | Facility: CLINIC | Age: 33
End: 2018-11-09
Attending: MIDWIFE
Payer: COMMERCIAL

## 2018-11-09 ENCOUNTER — RADIANT APPOINTMENT (OUTPATIENT)
Dept: ULTRASOUND IMAGING | Facility: CLINIC | Age: 33
End: 2018-11-09
Attending: MIDWIFE
Payer: COMMERCIAL

## 2018-11-09 VITALS
HEIGHT: 72 IN | WEIGHT: 215.2 LBS | BODY MASS INDEX: 29.15 KG/M2 | DIASTOLIC BLOOD PRESSURE: 87 MMHG | HEART RATE: 88 BPM | SYSTOLIC BLOOD PRESSURE: 135 MMHG

## 2018-11-09 DIAGNOSIS — O09.93 SUPERVISION OF HIGH RISK PREGNANCY IN THIRD TRIMESTER: ICD-10-CM

## 2018-11-09 DIAGNOSIS — I10 ESSENTIAL HYPERTENSION: ICD-10-CM

## 2018-11-09 DIAGNOSIS — I10 BENIGN ESSENTIAL HYPERTENSION: ICD-10-CM

## 2018-11-09 DIAGNOSIS — O09.93 SUPERVISION OF HIGH RISK PREGNANCY IN THIRD TRIMESTER: Primary | ICD-10-CM

## 2018-11-09 LAB
ALT SERPL W P-5'-P-CCNC: 18 U/L (ref 0–50)
ANION GAP SERPL CALCULATED.3IONS-SCNC: 10 MMOL/L (ref 3–14)
AST SERPL W P-5'-P-CCNC: 16 U/L (ref 0–45)
BUN SERPL-MCNC: 9 MG/DL (ref 7–30)
CALCIUM SERPL-MCNC: 9.3 MG/DL (ref 8.5–10.1)
CHLORIDE SERPL-SCNC: 103 MMOL/L (ref 94–109)
CO2 SERPL-SCNC: 25 MMOL/L (ref 20–32)
CREAT SERPL-MCNC: 0.71 MG/DL (ref 0.52–1.04)
CREAT UR-MCNC: 79 MG/DL
ERYTHROCYTE [DISTWIDTH] IN BLOOD BY AUTOMATED COUNT: 13.7 % (ref 10–15)
GFR SERPL CREATININE-BSD FRML MDRD: >90 ML/MIN/1.7M2
GLUCOSE SERPL-MCNC: 77 MG/DL (ref 70–99)
HCT VFR BLD AUTO: 37.8 % (ref 35–47)
HGB BLD-MCNC: 12.5 G/DL (ref 11.7–15.7)
MCH RBC QN AUTO: 29.9 PG (ref 26.5–33)
MCHC RBC AUTO-ENTMCNC: 33.1 G/DL (ref 31.5–36.5)
MCV RBC AUTO: 90 FL (ref 78–100)
PLATELET # BLD AUTO: 225 10E9/L (ref 150–450)
POTASSIUM SERPL-SCNC: 3.9 MMOL/L (ref 3.4–5.3)
PROT UR-MCNC: 0.11 G/L
PROT/CREAT 24H UR: 0.14 G/G CR (ref 0–0.2)
RBC # BLD AUTO: 4.18 10E12/L (ref 3.8–5.2)
SODIUM SERPL-SCNC: 138 MMOL/L (ref 133–144)
URATE SERPL-MCNC: 4 MG/DL (ref 2.6–6)
WBC # BLD AUTO: 11.5 10E9/L (ref 4–11)

## 2018-11-09 PROCEDURE — 36415 COLL VENOUS BLD VENIPUNCTURE: CPT | Performed by: MIDWIFE

## 2018-11-09 PROCEDURE — G0463 HOSPITAL OUTPT CLINIC VISIT: HCPCS | Mod: 25,ZF

## 2018-11-09 PROCEDURE — 84550 ASSAY OF BLOOD/URIC ACID: CPT | Performed by: MIDWIFE

## 2018-11-09 PROCEDURE — 82565 ASSAY OF CREATININE: CPT | Performed by: MIDWIFE

## 2018-11-09 PROCEDURE — 84460 ALANINE AMINO (ALT) (SGPT): CPT | Performed by: MIDWIFE

## 2018-11-09 PROCEDURE — 84450 TRANSFERASE (AST) (SGOT): CPT | Performed by: MIDWIFE

## 2018-11-09 PROCEDURE — 76819 FETAL BIOPHYS PROFIL W/O NST: CPT

## 2018-11-09 PROCEDURE — 84156 ASSAY OF PROTEIN URINE: CPT | Performed by: MIDWIFE

## 2018-11-09 PROCEDURE — 85027 COMPLETE CBC AUTOMATED: CPT | Performed by: MIDWIFE

## 2018-11-09 PROCEDURE — 80048 BASIC METABOLIC PNL TOTAL CA: CPT | Performed by: MIDWIFE

## 2018-11-09 RX ORDER — METHYLDOPA 250 MG/1
250 TABLET, FILM COATED ORAL 2 TIMES DAILY
Qty: 60 TABLET | Refills: 1 | Status: SHIPPED | OUTPATIENT
Start: 2018-11-09 | End: 2021-10-20

## 2018-11-09 ASSESSMENT — PAIN SCALES - GENERAL: PAINLEVEL: NO PAIN (0)

## 2018-11-09 NOTE — LETTER
Date:November 13, 2018      Patient was self referred, no letter generated. Do not send.        [facility] Health Information

## 2018-11-09 NOTE — LETTER
2018       RE: Eleni Vallecillo  [address] [facility] [address]     Dear Colleague,    Thank you for referring your patient, Eleni Vallecillo, to the [facility] at [facility]. Please see a copy of my visit note below.    S OB Intake note  Subjective   32 year old woman presents to clinic for TRANSFER of OB care .  Patient's last menstrual period was 2018 (exact date).    at 34w2d by Estimated Date of Delivery: Dec 19, 2018 based on US confirms.  Reviewed dating ultrasound. Pregnancy is planned.  Pt was seen at [place] by MD.  She had a 10 wk , 24, and 28 wk visit last visit     Pt had normal first pregnancy and birth experience with midwife, waterbirth no complications  Her BP was noted to be elevated following delivery and was  Subsequently prescribed aldomet 250 mg BID.   She states she felt fine  And stopped her med and did not see MD in between much.  Her BP was noted to be  Up at beginning of this pregnancy and was advised to take the Aldomet BID and low dose ASA  She is checking her BPs daily at home she brings 2 charts scanned in to epic  Ranges to  122/72 - 140/91 she denies any recent severe H/A blurred vision, epigastric pain.  Her last US was her 20 wk US/ she passed glucose screening.    She has a labor  which was the same as last birth.          - Current Medications     Current PNV, ALdomet takes 250mg once daily and folic acid        Current Outpatient Prescriptions   Medication Sig Dispense Refill     aspirin 81 MG EC tablet Take 1 tablet (81 mg) by mouth daily 90 tablet 3     FOLIC ACID PO Take 1 mg by mouth daily       methyldopa (ALDOMET) 250 MG tablet Take 1 tablet (250 mg) by mouth 2 times daily 60 tablet 1     Prenatal Vit-Fe Fumarate-FA (PRENATAL MULTIVITAMIN PLUS IRON) 27-0.8 MG TABS per tablet Take 1 tablet by mouth daily 100 tablet 3     [DISCONTINUED] methyldopa (ALDOMET) 250 MG tablet Take 1  tablet (250 mg) by mouth daily           - Co-morbids chronic HTN    Past Medical History:   Diagnosis Date     Hypertension            PERSONAL/SOCIAL HISTORY   here with spouse   lives with their family.  Education completed today includes breast feeding, [facility] hand out , contraception, counting movements, signs of pre-term labor, when to present to birthplace, post partum depression, GBS, getting enough iron and labor induction.  Birth preferences reviewed: : hoping to use waterbirth tub   Not waterbirth candidate   Labor support:   and     Feeding plans :      The following labs were ordered today:       CBC w platelets and BMP,  ALT AST, uric acid urine pro/creat   Water birth consent form was not given.  Blood type:   ABO   Date Value Ref Range Status   2018 O  Final     RH(D)   Date Value Ref Range Status   2018 Pos  Final     Antibody Screen   Date Value Ref Range Status   2018 Neg  Final   , Rhogam  was notgiven.  TDAP  was notgiven. Declined and flu shot declined     Objective  -VS: reviewed and within normal limits   -General appearance: no acute distress, patient is comfortable   NEUROLOGICAL/PSYCHIATRIC   - Orientated x3,   -Mood and affect: : normal     Growth US and BPP done today  AGA, 8/8  Normal HAYLEY     Assessment/Plan  Orders Placed This Encounter   Procedures     US OB >14 Weeks Follow Up     ALT     AST     Basic metabolic panel     CBC with platelets     Uric acid     Protein  random urine with Creat Ratio     Creatinine urine calculation only       REVIEWED RECOMMENDED CARE per MFM guidelines  Pregnancy with chronic HTN on medications   Advise repeat preeclampsia labs today  Growth US q 4 wks  And start weekly BPPs now  Advise IOL 38-39 wks   Not a waterbirth candidate  Advise MD care     CONSULTED BARTOLO ARGUELLO advise increase ALDOMET  MG BID now    START ASA 81 mg po daily reviewed indications chronic HTN   - COUPLE is  considering whether they stay in [facility] drive is 1 h 20 min and if no access to waterbirth or CNM for delivery may stay with her current MD due to distance    Pt to RTO for MD visit in 1 week and prn if questions or concerns    RUPERT Dash CNM          Again, thank you for allowing me to participate in the care of your patient.      Sincerely,    RUPERT Dash CNM

## 2018-11-09 NOTE — PATIENT INSTRUCTIONS
Thank you for choosing [facility] ([facility] for your obstetrical care.  We are an integrated health clinic with obstetricians, midwives, a psychologist, an acupuncturist, massage, a nutritionist,a pharmacist, internal medicine and family practice all in one clinic.  If you have questions about services offered please ask.    Keep all appointments with your provider.  You will help catch, prevent and treat problems early.    Review your Expectant Family booklet given to you at this intake visit.  It can answer many basic questions including:    Treatment for nausea and vomiting p.22    Medications that are safe in pregnancy (see handout)    Healthy diet and weight gain p.7-8    Exercise and activity p.9    ASK questions!!  Please write down any questions or concerns for your next visit.    Eat a healthy diet.  Visit www.choosemyplate.gov and click on Pregnancy and Breastfeeding for information and tips    Do not smoke.  Avoid other people's smoke, too.  We are happy to help with referrals to stop smoking programs.    Do not drink alcohol.    Try to avoid people who have colds or other infections.  Practice good hand washing.    Consider registering for prenatal education classes through [employer] at  [facility].  You can view class schedules and register online at [email or web address] or call [phone] for questions    For urgent concerns call [facility] at [phone]  to talk with a triage nurse during regular clinic hours (8am-4:30pm).  This line is answered by our service 24 hours a day, after hours a provider will return your call.

## 2018-11-09 NOTE — PROGRESS NOTES
[facility] OB Intake note  Subjective   32 year old woman presents to clinic for TRANSFER of OB care .  Patient's last menstrual period was 2018 (exact date).    at 34w2d by Estimated Date of Delivery: Dec 19, 2018 based on US confirms.  Reviewed dating ultrasound. Pregnancy is planned.  Pt was seen at [place] by MD.  She had a 10 wk , 24, and 28 wk visit last visit     Pt had normal first pregnancy and birth experience with midwife, waterbirth no complications  Her BP was noted to be elevated following delivery and was  Subsequently prescribed aldomet 250 mg BID.   She states she felt fine  And stopped her med and did not see MD in between much.  Her BP was noted to be  Up at beginning of this pregnancy and was advised to take the Aldomet BID and low dose ASA  She is checking her BPs daily at home she brings 2 charts scanned in to epic  Ranges to  122/72 - 140/91 she denies any recent severe H/A blurred vision, epigastric pain.  Her last US was her 20 wk US/ she passed glucose screening.    She has a labor  which was the same as last birth.          - Current Medications     Current PNV, ALdomet takes 250mg once daily and folic acid        Current Outpatient Prescriptions   Medication Sig Dispense Refill     aspirin 81 MG EC tablet Take 1 tablet (81 mg) by mouth daily 90 tablet 3     FOLIC ACID PO Take 1 mg by mouth daily       methyldopa (ALDOMET) 250 MG tablet Take 1 tablet (250 mg) by mouth 2 times daily 60 tablet 1     Prenatal Vit-Fe Fumarate-FA (PRENATAL MULTIVITAMIN PLUS IRON) 27-0.8 MG TABS per tablet Take 1 tablet by mouth daily 100 tablet 3     [DISCONTINUED] methyldopa (ALDOMET) 250 MG tablet Take 1 tablet (250 mg) by mouth daily           - Co-morbids chronic HTN    Past Medical History:   Diagnosis Date     Hypertension            PERSONAL/SOCIAL HISTORY   here with spouse   lives with their family.  Education completed today includes breast feeding, [facility] hand out ,  contraception, counting movements, signs of pre-term labor, when to present to birthplace, post partum depression, GBS, getting enough iron and labor induction.  Birth preferences reviewed: : hoping to use waterbirth tub   Not waterbirth candidate   Labor support:   and    [place] Feeding plans :      The following labs were ordered today:       CBC w platelets and BMP,  ALT AST, uric acid urine pro/creat   Water birth consent form was not given.  Blood type:   ABO   Date Value Ref Range Status   2018 O  Final     RH(D)   Date Value Ref Range Status   2018 Pos  Final     Antibody Screen   Date Value Ref Range Status   2018 Neg  Final   , Rhogam  was notgiven.  TDAP  was notgiven. Declined and flu shot declined     Objective  -VS: reviewed and within normal limits   -General appearance: no acute distress, patient is comfortable   NEUROLOGICAL/PSYCHIATRIC   - Orientated x3,   -Mood and affect: : normal     Growth US and BPP done today  AGA, 8/8  Normal HAYLEY     Assessment/Plan  Orders Placed This Encounter   Procedures     US OB >14 Weeks Follow Up     ALT     AST     Basic metabolic panel     CBC with platelets     Uric acid     Protein  random urine with Creat Ratio     Creatinine urine calculation only       REVIEWED RECOMMENDED CARE per MFM guidelines  Pregnancy with chronic HTN on medications   Advise repeat preeclampsia labs today  Growth US q 4 wks  And start weekly BPPs now  Advise IOL 38-39 wks   Not a waterbirth candidate  Advise MD care     CONSULTED BARTOLO ARGUELLO advise increase ALDOMET  MG BID now    START ASA 81 mg po daily reviewed indications chronic HTN   - COUPLE is considering whether they stay in [facility] drive is 1 h 20 min and if no access to waterbirth or CNM for delivery may stay with her current MD due to distance    Pt to RTO for MD visit in 1 week and prn if questions or concerns    RUPERT DashM

## 2018-11-09 NOTE — MR AVS SNAPSHOT
After Visit Summary   11/9/2018    Eleni Vallecillo    MRN: [id number]           Patient Information     Date Of Birth          1985        Visit Information        Provider Department      11/9/2018 9:00 AM Odalis White APRN CNM [facility]        Today's Diagnoses     Supervision of high risk pregnancy in third trimester    -  1    Essential hypertension        Benign essential hypertension          Care Instructions      Thank you for choosing [facility] for your obstetrical care.  We are an integrated health clinic with obstetricians, midwives, a psychologist, an acupuncturist, massage, a nutritionist,a pharmacist, internal medicine and family practice all in one clinic.  If you have questions about services offered please ask.    Keep all appointments with your provider.  You will help catch, prevent and treat problems early.    Review your Expectant Family booklet given to you at this intake visit.  It can answer many basic questions including:    Treatment for nausea and vomiting p.22    Medications that are safe in pregnancy (see handout)    Healthy diet and weight gain p.7-8    Exercise and activity p.9    ASK questions!!  Please write down any questions or concerns for your next visit.    Eat a healthy diet.  Visit www.choosemyplate.gov and click on Pregnancy and Breastfeeding for information and tips    Do not smoke.  Avoid other people's smoke, too.  We are happy to help with referrals to stop smoking programs.    Do not drink alcohol.    Try to avoid people who have colds or other infections.  Practice good hand washing.    Consider registering for prenatal education classes through [facility] at  [facility].  You can view class schedules and register online at [email or web address] or call [phone] for questions    For urgent concerns call [facility] at [phone]  to talk with a triage nurse during regular  "clinic hours (8am-4:30pm).  This line is answered by our service 24 hours a day, after hours a provider will return your call.            Follow-ups after your visit        Follow-up notes from your care team     Return in about 1 week (around 11/16/2018) for VENITA.      Future tests that were ordered for you today     Open Standing Orders        Priority Remaining Interval Expires Ordered    US OB Biophys Single Gestation Measure Routine 4/6  11/9/2019 11/9/2018          Open Future Orders        Priority Expected Expires Ordered    US OB >14 Weeks Follow Up Routine  11/9/2019 11/9/2018            Who to contact     Please call your clinic at [phone] to:    Ask questions about your health    Make or cancel appointments    Discuss your medicines    Learn about your test results    Speak to your doctor            Additional Information About Your Visit        [employer] Information     [email or web address] gives you secure access to your electronic health record. If you see a primary care provider, you can also send messages to your care team and make appointments. If you have questions, please call your primary care clinic.  If you do not have a primary care provider, please call [phone] and they will assist you.      [email or web address] is an electronic gateway that provides easy, online access to your medical records. With [email or web address], you can request a clinic appointment, read your test results, renew a prescription or communicate with your care team.     To access your existing account, please contact your [facility] or call [phone] for assistance.        Care EveryWhere ID     This is your Care EveryWhere ID. This could be used by other organizations to access your [place] medical records  [id number]        Your Vitals Were     Pulse Height Last Period BMI (Body Mass Index)          88 1.829 m (6' 0.01\") 03/14/2018 (Exact Date) 29.18 kg/m2         Blood Pressure from Last 3 Encounters:   11/09/18 " 135/87   09/28/18 138/80   08/29/18 122/80    Weight from Last 3 Encounters:   11/09/18 97.6 kg (215 lb 3.2 oz)   09/28/18 97.1 kg (214 lb)   08/29/18 93.2 kg (205 lb 6.4 oz)              We Performed the Following     ALT     AST     Basic metabolic panel     CBC with platelets     Creatinine urine calculation only     Protein  random urine with Creat Ratio     Uric acid          Today's Medication Changes          These changes are accurate as of 11/9/18  1:51 PM.  If you have any questions, ask your nurse or doctor.               Start taking these medicines.        Dose/Directions    aspirin 81 MG EC tablet   Used for:  Essential hypertension, Supervision of high risk pregnancy in third trimester   Replaces:  aspirin 81 MG tablet   Started by:  Odalis White APRN CNM        Dose:  81 mg   Take 1 tablet (81 mg) by mouth daily   Quantity:  90 tablet   Refills:  3         These medicines have changed or have updated prescriptions.        Dose/Directions    methyldopa 250 MG tablet   Commonly known as:  ALDOMET   This may have changed:  when to take this   Used for:  Supervision of high risk pregnancy in third trimester, Essential hypertension   Changed by:  Odalis White APRN CNM        Dose:  250 mg   Take 1 tablet (250 mg) by mouth 2 times daily   Quantity:  60 tablet   Refills:  1         Stop taking these medicines if you haven't already. Please contact your care team if you have questions.     aspirin 81 MG tablet   Replaced by:  aspirin 81 MG EC tablet   Stopped by:  Odalis White APRN CNM                Where to get your medicines      These medications were sent to [facility] [address] [place] [address] & [address]  [address] [place] [id number]     Phone:  [phone]     aspirin 81 MG EC tablet    methyldopa 250 MG tablet                Primary Care Provider Fax #    Physician No Ref-Primary [phone]       No address on file         Equal Access to Services     [facility] : Hadii aad ku haddanealycia Leannabram, waedda luqadaha, qaybta mcbriannenicolas odonnell. So [facility] [phone].    ATENCIÓN: Si habla español, tiene a felton disposición servicios gratuitos de asistencia lingüística. Llame al [phone].    We comply with applicable federal civil rights laws and [place] laws. We do not discriminate on the basis of race, color, national origin, age, disability, sex, sexual orientation, or gender identity.            Thank you!     Thank you for choosing [facility]  for your care. Our goal is always to provide you with excellent care. Hearing back from our patients is one way we can continue to improve our services. Please take a few minutes to complete the written survey that you may receive in the mail after your visit with us. Thank you!             Your Updated Medication List - Protect others around you: Learn how to safely use, store and throw away your medicines at www.disposemymeds.org.          This list is accurate as of 11/9/18  1:51 PM.  Always use your most recent med list.                   Brand Name Dispense Instructions for use Diagnosis    aspirin 81 MG EC tablet     90 tablet    Take 1 tablet (81 mg) by mouth daily    Essential hypertension, Supervision of high risk pregnancy in third trimester       FOLIC ACID PO      Take 1 mg by mouth daily        methyldopa 250 MG tablet    ALDOMET    60 tablet    Take 1 tablet (250 mg) by mouth 2 times daily    Supervision of high risk pregnancy in third trimester, Essential hypertension       prenatal multivitamin plus iron 27-0.8 MG Tabs per tablet     100 tablet    Take 1 tablet by mouth daily

## 2020-03-11 ENCOUNTER — HEALTH MAINTENANCE LETTER (OUTPATIENT)
Age: 35
End: 2020-03-11

## 2021-01-03 ENCOUNTER — HEALTH MAINTENANCE LETTER (OUTPATIENT)
Age: 36
End: 2021-01-03

## 2021-04-25 ENCOUNTER — HEALTH MAINTENANCE LETTER (OUTPATIENT)
Age: 36
End: 2021-04-25

## 2021-10-10 ENCOUNTER — HEALTH MAINTENANCE LETTER (OUTPATIENT)
Age: 36
End: 2021-10-10

## 2021-10-20 ENCOUNTER — OFFICE VISIT (OUTPATIENT)
Dept: FAMILY MEDICINE | Facility: CLINIC | Age: 36
End: 2021-10-20
Payer: COMMERCIAL

## 2021-10-20 VITALS
SYSTOLIC BLOOD PRESSURE: 146 MMHG | RESPIRATION RATE: 16 BRPM | WEIGHT: 181 LBS | TEMPERATURE: 98.3 F | HEART RATE: 82 BPM | DIASTOLIC BLOOD PRESSURE: 104 MMHG | BODY MASS INDEX: 24.52 KG/M2 | OXYGEN SATURATION: 98 % | HEIGHT: 72 IN

## 2021-10-20 DIAGNOSIS — F40.10 SOCIAL ANXIETY DISORDER: ICD-10-CM

## 2021-10-20 DIAGNOSIS — I10 HYPERTENSION, UNSPECIFIED TYPE: Primary | ICD-10-CM

## 2021-10-20 LAB
ANION GAP SERPL CALCULATED.3IONS-SCNC: 5 MMOL/L (ref 3–14)
BUN SERPL-MCNC: 21 MG/DL (ref 7–30)
CALCIUM SERPL-MCNC: 9 MG/DL (ref 8.5–10.1)
CHLORIDE BLD-SCNC: 104 MMOL/L (ref 94–109)
CHOLEST SERPL-MCNC: 167 MG/DL
CO2 SERPL-SCNC: 29 MMOL/L (ref 20–32)
CREAT SERPL-MCNC: 1.03 MG/DL (ref 0.52–1.04)
FASTING STATUS PATIENT QL REPORTED: NO
GFR SERPL CREATININE-BSD FRML MDRD: 70 ML/MIN/1.73M2
GLUCOSE BLD-MCNC: 93 MG/DL (ref 70–99)
HDLC SERPL-MCNC: 90 MG/DL
LDLC SERPL CALC-MCNC: 67 MG/DL
NONHDLC SERPL-MCNC: 77 MG/DL
POTASSIUM BLD-SCNC: 4.2 MMOL/L (ref 3.4–5.3)
SODIUM SERPL-SCNC: 138 MMOL/L (ref 133–144)
TRIGL SERPL-MCNC: 49 MG/DL
TSH SERPL DL<=0.005 MIU/L-ACNC: 1.64 MU/L (ref 0.4–4)

## 2021-10-20 PROCEDURE — 84443 ASSAY THYROID STIM HORMONE: CPT | Performed by: STUDENT IN AN ORGANIZED HEALTH CARE EDUCATION/TRAINING PROGRAM

## 2021-10-20 PROCEDURE — 36415 COLL VENOUS BLD VENIPUNCTURE: CPT | Performed by: STUDENT IN AN ORGANIZED HEALTH CARE EDUCATION/TRAINING PROGRAM

## 2021-10-20 PROCEDURE — 99204 OFFICE O/P NEW MOD 45 MIN: CPT | Performed by: STUDENT IN AN ORGANIZED HEALTH CARE EDUCATION/TRAINING PROGRAM

## 2021-10-20 PROCEDURE — 80048 BASIC METABOLIC PNL TOTAL CA: CPT | Performed by: STUDENT IN AN ORGANIZED HEALTH CARE EDUCATION/TRAINING PROGRAM

## 2021-10-20 PROCEDURE — 80061 LIPID PANEL: CPT | Performed by: STUDENT IN AN ORGANIZED HEALTH CARE EDUCATION/TRAINING PROGRAM

## 2021-10-20 RX ORDER — HYDROCHLOROTHIAZIDE 12.5 MG/1
12.5 CAPSULE ORAL DAILY
Qty: 90 CAPSULE | Refills: 3 | Status: SHIPPED | OUTPATIENT
Start: 2021-10-20 | End: 2021-12-27 | Stop reason: DRUGHIGH

## 2021-10-20 RX ORDER — PROPRANOLOL HYDROCHLORIDE 10 MG/1
10 TABLET ORAL DAILY PRN
Qty: 60 TABLET | Refills: 3 | Status: SHIPPED | OUTPATIENT
Start: 2021-10-20 | End: 2022-11-08

## 2021-10-20 ASSESSMENT — PAIN SCALES - GENERAL: PAINLEVEL: NO PAIN (0)

## 2021-10-20 ASSESSMENT — MIFFLIN-ST. JEOR: SCORE: 1618.01

## 2021-10-21 ASSESSMENT — ASTHMA QUESTIONNAIRES: ACT_TOTALSCORE: 25

## 2021-10-25 ENCOUNTER — ALLIED HEALTH/NURSE VISIT (OUTPATIENT)
Dept: FAMILY MEDICINE | Facility: CLINIC | Age: 36
End: 2021-10-25
Payer: COMMERCIAL

## 2021-10-25 DIAGNOSIS — Z23 NEED FOR TD VACCINE: Primary | ICD-10-CM

## 2021-10-25 PROCEDURE — 90714 TD VACC NO PRESV 7 YRS+ IM: CPT

## 2021-10-25 PROCEDURE — 90471 IMMUNIZATION ADMIN: CPT

## 2021-10-25 PROCEDURE — 99207 PR NO CHARGE NURSE ONLY: CPT

## 2021-10-25 NOTE — PROGRESS NOTES
Prior to immunization administration, verified patients identity using patient s name and date of birth. Please see Immunization Activity for additional information.     Screening Questionnaire for Adult Immunization    Are you sick today?   No   Do you have allergies to medications, food, a vaccine component or latex?   No   Have you ever had a serious reaction after receiving a vaccination?   No   Do you have a long-term health problem with heart, lung, kidney, or metabolic disease (e.g., diabetes), asthma, a blood disorder, no spleen, complement component deficiency, a cochlear implant, or a spinal fluid leak?  Are you on long-term aspirin therapy?   No   Do you have cancer, leukemia, HIV/AIDS, or any other immune system problem?   No   Do you have a parent, brother, or sister with an immune system problem?   No   In the past 3 months, have you taken medications that affect  your immune system, such as prednisone, other steroids, or anticancer drugs; drugs for the treatment of rheumatoid arthritis, Crohn s disease, or psoriasis; or have you had radiation treatments?   No   Have you had a seizure, or a brain or other nervous system problem?   No   During the past year, have you received a transfusion of blood or blood    products, or been given immune (gamma) globulin or antiviral drug?   No   For women: Are you pregnant or is there a chance you could become       pregnant during the next month?   No   Have you received any vaccinations in the past 4 weeks?   No     Immunization questionnaire answers were all negative.        Per orders of Dr. Pineda, injection of Td given by Mariel Toussaint CMA. Patient instructed to remain in clinic for 15 minutes afterwards, and to report any adverse reaction to me immediately.       Screening performed by Mariel Toussaint CMA on 10/25/2021 at 3:16 PM.

## 2021-12-17 ENCOUNTER — OFFICE VISIT (OUTPATIENT)
Dept: FAMILY MEDICINE | Facility: CLINIC | Age: 36
End: 2021-12-17
Payer: COMMERCIAL

## 2021-12-17 VITALS
WEIGHT: 177 LBS | HEART RATE: 83 BPM | OXYGEN SATURATION: 99 % | SYSTOLIC BLOOD PRESSURE: 146 MMHG | DIASTOLIC BLOOD PRESSURE: 110 MMHG | HEIGHT: 72 IN | BODY MASS INDEX: 23.98 KG/M2 | TEMPERATURE: 98.4 F

## 2021-12-17 DIAGNOSIS — Z01.818 PRE-OP EVALUATION: Primary | ICD-10-CM

## 2021-12-17 DIAGNOSIS — I10 BENIGN ESSENTIAL HYPERTENSION: ICD-10-CM

## 2021-12-17 DIAGNOSIS — Z41.1 ENCOUNTER FOR COSMETIC SURGERY: ICD-10-CM

## 2021-12-17 LAB
ANION GAP SERPL CALCULATED.3IONS-SCNC: 5 MMOL/L (ref 3–14)
BUN SERPL-MCNC: 19 MG/DL (ref 7–30)
CALCIUM SERPL-MCNC: 9.7 MG/DL (ref 8.5–10.1)
CHLORIDE BLD-SCNC: 105 MMOL/L (ref 94–109)
CO2 SERPL-SCNC: 29 MMOL/L (ref 20–32)
CREAT SERPL-MCNC: 0.92 MG/DL (ref 0.52–1.04)
GFR SERPL CREATININE-BSD FRML MDRD: 79 ML/MIN/1.73M2
GLUCOSE BLD-MCNC: 91 MG/DL (ref 70–99)
HGB BLD-MCNC: 13.8 G/DL (ref 11.7–15.7)
POTASSIUM BLD-SCNC: 4.2 MMOL/L (ref 3.4–5.3)
SODIUM SERPL-SCNC: 139 MMOL/L (ref 133–144)

## 2021-12-17 PROCEDURE — 99214 OFFICE O/P EST MOD 30 MIN: CPT | Performed by: NURSE PRACTITIONER

## 2021-12-17 PROCEDURE — 80048 BASIC METABOLIC PNL TOTAL CA: CPT | Performed by: NURSE PRACTITIONER

## 2021-12-17 PROCEDURE — 36415 COLL VENOUS BLD VENIPUNCTURE: CPT | Performed by: NURSE PRACTITIONER

## 2021-12-17 PROCEDURE — 85018 HEMOGLOBIN: CPT | Performed by: NURSE PRACTITIONER

## 2021-12-17 ASSESSMENT — MIFFLIN-ST. JEOR: SCORE: 1594.87

## 2021-12-17 NOTE — PROGRESS NOTES
[facility]  [address] [address]  Phone: [phone]  Primary Provider: No Ref-Primary, Physician  Pre-op Performing Provider: ADEOLA ASENCIO      PREOPERATIVE EVALUATION:  Today's date: 12/17/2021    Eleni Vallecillo is a 36 year old female who presents for a preoperative evaluation.    Surgical Information:  Surgery/Procedure: breast Augmentation  Surgery Location: [facility] plastic surgery  Surgeon: Dr. Finch  Surgery Date: 12/23/2021  Time of Surgery: tbd  Where patient plans to recover: At home with family  Fax number for surgical facility: [phone]    Type of Anesthesia Anticipated: General    Assessment & Plan     The proposed surgical procedure is considered INTERMEDIATE risk.    Pre-op evaluation  - Hemoglobin; Future  - Basic metabolic panel  (Ca, Cl, CO2, Creat, Gluc, K, Na, BUN); Future    Encounter for cosmetic surgery  - Hemoglobin; Future  - Basic metabolic panel  (Ca, Cl, CO2, Creat, Gluc, K, Na, BUN); Future    Benign essential hypertension  - Hemoglobin; Future  - Basic metabolic panel  (Ca, Cl, CO2, Creat, Gluc, K, Na, BUN); Future       Risks and Recommendations:  The patient has the following additional risks and recommendations for perioperative complications:   - No identified additional risk factors other than previously addressed    Medication Instructions:  Patient is to take all scheduled medications on the day of surgery EXCEPT for modifications listed below:   - Diuretics: HOLD on the day of surgery.    RECOMMENDATION:  APPROVAL GIVEN to proceed with proposed procedure, without further diagnostic evaluation.                    Subjective     HPI related to upcoming procedure:   Patient requesting breast augmentation      Preop Questions 12/17/2021   1. Have you ever had a heart attack or stroke? No   2. Have you ever had surgery on your heart or blood vessels, such as a stent placement, a coronary artery bypass, or surgery on an artery  in your head, neck, heart, or legs? No   3. Do you have chest pain with activity? No   4. Do you have a history of  heart failure? No   5. Do you currently have a cold, bronchitis or symptoms of other infection? No   6. Do you have a cough, shortness of breath, or wheezing? No   7. Do you or anyone in your family have previous history of blood clots? No   8. Do you or does anyone in your family have a serious bleeding problem such as prolonged bleeding following surgeries or cuts? No   9. Have you ever had problems with anemia or been told to take iron pills? No   10. Have you had any abnormal blood loss such as black, tarry or bloody stools, or abnormal vaginal bleeding? No   11. Have you ever had a blood transfusion? No   12. Are you willing to have a blood transfusion if it is medically needed before, during, or after your surgery? NO   13. Have you or any of your relatives ever had problems with anesthesia? No   14. Do you have sleep apnea, excessive snoring or daytime drowsiness? No   15. Do you have any artifical heart valves or other implanted medical devices like a pacemaker, defibrillator, or continuous glucose monitor? No   16. Do you have artificial joints? No   17. Are you allergic to latex? No   18. Is there any chance that you may be pregnant? No     Health Care Directive:  Patient does not have a Health Care Directive or Living Will: Discussed advance care planning with patient; however, patient declined at this time.        Status of Chronic Conditions:  HYPERTENSION - Patient has longstanding history of HTN , currently denies any symptoms referable to elevated blood pressure. Specifically denies chest pain, palpitations, dyspnea, orthopnea, PND or peripheral edema. Blood pressure readings have not been in normal range. Current medication regimen is as listed below. Patient denies any side effects of medication.       Review of Systems  CONSTITUTIONAL: NEGATIVE for fever, chills, change in  weight  INTEGUMENTARY/SKIN: NEGATIVE for worrisome rashes, moles or lesions  EYES: NEGATIVE for vision changes or irritation  ENT/MOUTH: NEGATIVE for ear, mouth and throat problems  RESP: NEGATIVE for significant cough or SOB  CV: NEGATIVE for chest pain, palpitations or peripheral edema  GI: NEGATIVE for nausea, abdominal pain, heartburn, or change in bowel habits  : NEGATIVE for frequency, dysuria, or hematuria  MUSCULOSKELETAL: NEGATIVE for significant arthralgias or myalgia  NEURO: NEGATIVE for weakness, dizziness or paresthesias  ENDOCRINE: NEGATIVE for temperature intolerance, skin/hair changes  HEME: NEGATIVE for bleeding problems  PSYCHIATRIC: NEGATIVE for changes in mood or affect    Patient Active Problem List    Diagnosis Date Noted     Supervision of high risk pregnancy in third trimester 10/11/2018     Priority: Medium     AWILDA at 34 wks  1st visit at 10 wks x 3 visits at  24, 28 wks  Passed GCT  Baseline Pre-e labs done at 28 wks   Had dating and 20 wk growth US only       Pt hoping to AWILDA for waterbirth and CNM care,  Pt had a consult appt at [facility]   Advised pt chronic HTN  Not waterbirth candidate and advise MD care      Jehovah Witness  No blood products        Benign essential hypertension 05/29/2018     Priority: Medium     11/9/18  AWIL[facility] visit to [facility]  Advise MD care chronic HTN on Meds. 34 wks pregnant   Advise not a waterbirth.  candidate   Dx 2014 following birth of 1st child   Taking Aldomet 250mg once  daily ( rx BID but pt states taking once daily )    MD consult today Dr. Sellers increase to BID Aldomet 250mg start ASA now ( pt not taking but was advised to start) repeat pre-eclampsia labs today   (9/28 last drawn ) at last visit   Reviewed BPs from pt charting daily  120-144/83-91 range   Per MFM guidelines :Advised serial growth  Starting after 20 wks  US q 3-4 wks,  Weekly BPPs starting at 32 wks  now 34 wks and recommend delivery by 38-39 if well controlled BPs    Pt to  start ASA 81 mg has not started per pt        Asthma 08/24/2007     Priority: Medium      Past Medical History:   Diagnosis Date     Hypertension 2014     Past Surgical History:   Procedure Laterality Date     NO HISTORY OF SURGERY       wisdom teeth      age 16     Current Outpatient Medications   Medication Sig Dispense Refill     propranolol (INDERAL) 10 MG tablet Take 1 tablet (10 mg) by mouth daily as needed (social anxiety) 60 tablet 3     hydrochlorothiazide (MICROZIDE) 12.5 MG capsule Take 1 capsule (12.5 mg) by mouth daily (Patient not taking: Reported on 12/17/2021) 90 capsule 3       No Known Allergies     Social History     Tobacco Use     Smoking status: Never Smoker     Smokeless tobacco: Never Used   Substance Use Topics     Alcohol use: Yes     Comment: minimal-  one drink per week       History   Drug Use No         Objective     BP (!) 146/110 (BP Location: Left arm, Cuff Size: Adult Large)   Pulse 83   Temp 98.4  F (36.9  C) (Tympanic)   Ht 1.829 m (6')   Wt 80.3 kg (177 lb)   LMP 12/03/2021   SpO2 99%   Breastfeeding No   BMI 24.01 kg/m      Physical Exam    GENERAL APPEARANCE: healthy, alert and no distress     EYES: EOMI, PERRL     HENT: ear canals and TM's normal and nose and mouth without ulcers or lesions     NECK: no adenopathy, no asymmetry, masses, or scars and thyroid normal to palpation     RESP: lungs clear to auscultation - no rales, rhonchi or wheezes     CV: regular rates and rhythm, normal S1 S2, no S3 or S4 and no murmur, click or rub     ABDOMEN:  soft, nontender, no HSM or masses and bowel sounds normal     MS: extremities normal- no gross deformities noted, no evidence of inflammation in joints, FROM in all extremities.     SKIN: no suspicious lesions or rashes     NEURO: Normal strength and tone, sensory exam grossly normal, mentation intact and speech normal     PSYCH: mentation appears normal. and affect normal/bright     LYMPHATICS: No cervical adenopathy    Recent  Labs   Lab Test 10/20/21  1000      POTASSIUM 4.2   CR 1.03        Diagnostics:  No results found for this or any previous visit (from the past 24 hour(s)).     No EKG required for low risk surgery (cataract, skin procedure, breast biopsy, etc).    Revised Cardiac Risk Index (RCRI):  The patient has the following serious cardiovascular risks for perioperative complications:   - No serious cardiac risks = 0 points     RCRI Interpretation: 0 points: Class I (very low risk - 0.4% complication rate)           Signed Electronically by: RUPERT Mandel CNP  Copy of this evaluation report is provided to requesting physician.

## 2021-12-24 ENCOUNTER — MYC MEDICAL ADVICE (OUTPATIENT)
Dept: FAMILY MEDICINE | Facility: CLINIC | Age: 36
End: 2021-12-24
Payer: COMMERCIAL

## 2021-12-24 DIAGNOSIS — I10 BENIGN ESSENTIAL HYPERTENSION: Primary | ICD-10-CM

## 2021-12-27 RX ORDER — HYDROCHLOROTHIAZIDE 25 MG/1
25 TABLET ORAL DAILY
Qty: 90 TABLET | Refills: 3 | Status: SHIPPED | OUTPATIENT
Start: 2021-12-27

## 2021-12-27 NOTE — TELEPHONE ENCOUNTER
See Wordyhart message. Pt wondering if needs dosage change to hydrochlorothiazide with her bp readings.    Jayme Duncan RN

## 2022-01-12 NOTE — TELEPHONE ENCOUNTER
Yelena  See latest bp's from pt for FYI. I responded to schedule a nurse visit to verify bp.     Jayme Duncan RN

## 2022-05-21 ENCOUNTER — HEALTH MAINTENANCE LETTER (OUTPATIENT)
Age: 37
End: 2022-05-21

## 2022-09-18 ENCOUNTER — HEALTH MAINTENANCE LETTER (OUTPATIENT)
Age: 37
End: 2022-09-18

## 2022-11-04 DIAGNOSIS — F40.10 SOCIAL ANXIETY DISORDER: ICD-10-CM

## 2022-11-07 NOTE — TELEPHONE ENCOUNTER
"Pending Prescriptions:                       Disp   Refills    propranolol (INDERAL) 10 MG tablet [Pharma*60 tab*3        Sig: TAKE 1 TABLET BY MOUTH DAILY AS NEEDED FOR SOCIAL           ANXIETY    Routing refill request to provider for review/approval because:  Patient needs to be seen because it has been more than 1 year since last office visit.  Requested Prescriptions   Pending Prescriptions Disp Refills    propranolol (INDERAL) 10 MG tablet [Pharmacy Med Name: PROPRANOLOL 10MG TABLETS] 60 tablet 3     Sig: TAKE 1 TABLET BY MOUTH DAILY AS NEEDED FOR SOCIAL ANXIETY       Beta-Blockers Protocol Failed - 11/4/2022  5:04 PM        Failed - Blood pressure under 140/90 in past 12 months     BP Readings from Last 3 Encounters:   12/17/21 (!) 146/110   10/20/21 (!) 146/104   11/09/18 135/87                 Passed - Patient is age 6 or older        Passed - Recent (12 mo) or future (30 days) visit within the authorizing provider's specialty     Patient has had an office visit with the authorizing provider or a provider within the authorizing providers department within the previous 12 mos or has a future within next 30 days. See \"Patient Info\" tab in inbasket, or \"Choose Columns\" in Meds & Orders section of the refill encounter.              Passed - Medication is active on med list                   "

## 2022-11-08 RX ORDER — PROPRANOLOL HYDROCHLORIDE 10 MG/1
TABLET ORAL
Qty: 60 TABLET | Refills: 3 | Status: SHIPPED | OUTPATIENT
Start: 2022-11-08

## 2023-06-04 ENCOUNTER — HEALTH MAINTENANCE LETTER (OUTPATIENT)
Age: 38
End: 2023-06-04

## 2023-09-05 NOTE — PROGRESS NOTES
Concerns: Patient checked her blood pressure at home today with a result of 128/83. Patient is feeling fine other than some pelvic pressure, more when she is active. She would like to skip the pelvic exam today.  No vaginal bleeding, loss of fluid, or regular contractions  Tdap declined.   Labs drawn today including baseline basic panel, GTT, RPR, CBC, LFTs, Uric acid, urine protein.   Will notify with results.  Discussed warning signs for preeclampsia.  Will f/u in 4 week(s) or sooner with any concern for decreased fetal movement, vaginal bleeding, fluid leak, headache, vision change, severe nausea or vomiting, abdominal pain, increased edema or other concerns.   Kandice Raza MD   Is the patient currently in the state of [place]? YES    Visit mode:VIDEO    If the visit is dropped, the patient can be reconnected by: VIDEO VISIT: Send to e-mail at: [email or web address]    Will anyone else be joining the visit? NO  (If patient encounters technical issues they should call [phone]    How would you like to obtain your AVS? MyChart    Are changes needed to the allergy or medication list? Pt stated no changes to allergies and Pt stated no med changes    Reason for visit: Follow Up    Dianelys Jolley LPN

## 2024-02-25 ENCOUNTER — HEALTH MAINTENANCE LETTER (OUTPATIENT)
Age: 39
End: 2024-02-25

## 2024-07-14 ENCOUNTER — HEALTH MAINTENANCE LETTER (OUTPATIENT)
Age: 39
End: 2024-07-14